# Patient Record
Sex: MALE | Race: BLACK OR AFRICAN AMERICAN | NOT HISPANIC OR LATINO | Employment: UNEMPLOYED | URBAN - METROPOLITAN AREA
[De-identification: names, ages, dates, MRNs, and addresses within clinical notes are randomized per-mention and may not be internally consistent; named-entity substitution may affect disease eponyms.]

---

## 2020-11-11 PROBLEM — H46.9 RIGHT OPTIC NEURITIS: Status: ACTIVE | Noted: 2020-11-11

## 2020-12-16 PROBLEM — H53.59 DYSCHROMATOPSIA: Status: ACTIVE | Noted: 2020-12-16

## 2020-12-16 PROBLEM — N52.8 OTHER MALE ERECTILE DYSFUNCTION: Status: ACTIVE | Noted: 2020-12-16

## 2020-12-16 PROBLEM — G35 MS (MULTIPLE SCLEROSIS): Status: ACTIVE | Noted: 2020-12-16

## 2020-12-16 PROBLEM — R53.83 OTHER FATIGUE: Status: ACTIVE | Noted: 2020-12-16

## 2020-12-16 PROBLEM — G35 OPTIC NEURITIS DUE TO MULTIPLE SCLEROSIS: Status: ACTIVE | Noted: 2020-11-11

## 2020-12-16 PROBLEM — G44.89 OTHER HEADACHE SYNDROME: Status: ACTIVE | Noted: 2020-12-16

## 2021-01-28 PROBLEM — R42 DIZZINESS: Status: ACTIVE | Noted: 2021-01-28

## 2021-01-30 PROBLEM — G44.89 OTHER HEADACHE SYNDROME: Status: RESOLVED | Noted: 2020-12-16 | Resolved: 2021-01-30

## 2021-01-30 PROBLEM — R53.83 OTHER FATIGUE: Status: RESOLVED | Noted: 2020-12-16 | Resolved: 2021-01-30

## 2021-01-31 PROBLEM — F41.9 ANXIETY: Status: ACTIVE | Noted: 2021-01-31

## 2021-12-21 ENCOUNTER — PATIENT MESSAGE (OUTPATIENT)
Dept: NEUROLOGY | Facility: CLINIC | Age: 36
End: 2021-12-21

## 2022-02-28 ENCOUNTER — PATIENT MESSAGE (OUTPATIENT)
Dept: PSYCHIATRY | Facility: CLINIC | Age: 37
End: 2022-02-28

## 2022-03-22 PROBLEM — E66.9 OBESITY: Status: ACTIVE | Noted: 2022-03-22

## 2022-03-22 PROBLEM — L73.2 HYDRADENITIS: Status: ACTIVE | Noted: 2022-03-22

## 2022-03-29 PROBLEM — R79.89 ABNORMAL THYROID BLOOD TEST: Status: ACTIVE | Noted: 2022-03-29

## 2022-06-24 ENCOUNTER — PATIENT MESSAGE (OUTPATIENT)
Dept: PSYCHIATRY | Facility: CLINIC | Age: 37
End: 2022-06-24

## 2022-11-22 PROBLEM — Z00.00 PREVENTATIVE HEALTH CARE: Status: ACTIVE | Noted: 2022-11-22

## 2022-11-22 PROBLEM — Z79.899 ENCOUNTER FOR LONG-TERM CURRENT USE OF MEDICATION: Status: ACTIVE | Noted: 2022-11-22

## 2022-12-01 ENCOUNTER — PATIENT MESSAGE (OUTPATIENT)
Dept: PSYCHIATRY | Facility: CLINIC | Age: 37
End: 2022-12-01

## 2023-02-10 PROBLEM — R26.89 NEED FOR ASSISTANCE DUE TO UNSTEADY GAIT: Status: ACTIVE | Noted: 2023-02-10

## 2023-02-20 ENCOUNTER — PATIENT MESSAGE (OUTPATIENT)
Dept: PSYCHIATRY | Facility: CLINIC | Age: 38
End: 2023-02-20

## 2023-02-27 PROBLEM — Z00.00 PREVENTATIVE HEALTH CARE: Status: RESOLVED | Noted: 2022-11-22 | Resolved: 2023-02-27

## 2023-05-24 PROBLEM — K21.9 GASTROESOPHAGEAL REFLUX DISEASE WITHOUT ESOPHAGITIS: Chronic | Status: ACTIVE | Noted: 2023-05-24

## 2023-05-24 PROBLEM — G47.00 INSOMNIA: Chronic | Status: ACTIVE | Noted: 2023-05-24

## 2023-07-21 ENCOUNTER — PATIENT MESSAGE (OUTPATIENT)
Dept: PSYCHIATRY | Facility: CLINIC | Age: 38
End: 2023-07-21

## 2023-07-31 ENCOUNTER — PATIENT MESSAGE (OUTPATIENT)
Dept: RESEARCH | Facility: HOSPITAL | Age: 38
End: 2023-07-31

## 2023-08-29 PROBLEM — R53.82 CHRONIC FATIGUE: Status: ACTIVE | Noted: 2020-12-16

## 2024-01-22 ENCOUNTER — PATIENT MESSAGE (OUTPATIENT)
Dept: PSYCHIATRY | Facility: CLINIC | Age: 39
End: 2024-01-22

## 2024-02-07 PROBLEM — F32.A DEPRESSION: Chronic | Status: ACTIVE | Noted: 2024-02-07

## 2024-02-07 PROBLEM — F43.10 PTSD (POST-TRAUMATIC STRESS DISORDER): Chronic | Status: ACTIVE | Noted: 2024-02-07

## 2024-02-15 PROBLEM — E66.09 CLASS 1 OBESITY DUE TO EXCESS CALORIES WITH SERIOUS COMORBIDITY AND BODY MASS INDEX (BMI) OF 32.0 TO 32.9 IN ADULT: Chronic | Status: ACTIVE | Noted: 2022-03-22

## 2024-02-15 PROBLEM — Z79.899 ENCOUNTER FOR LONG-TERM CURRENT USE OF MEDICATION: Status: RESOLVED | Noted: 2022-11-22 | Resolved: 2024-02-15

## 2024-02-15 PROBLEM — E66.811 CLASS 1 OBESITY DUE TO EXCESS CALORIES WITH SERIOUS COMORBIDITY AND BODY MASS INDEX (BMI) OF 32.0 TO 32.9 IN ADULT: Chronic | Status: ACTIVE | Noted: 2022-03-22

## 2024-02-15 PROBLEM — R79.89 ABNORMAL THYROID BLOOD TEST: Status: RESOLVED | Noted: 2022-03-29 | Resolved: 2024-02-15

## 2024-03-20 PROBLEM — L73.2 HYDRADENITIS: Chronic | Status: ACTIVE | Noted: 2022-03-22

## 2024-03-20 PROBLEM — F41.9 ANXIETY: Chronic | Status: ACTIVE | Noted: 2021-01-31

## 2024-03-26 ENCOUNTER — PATIENT MESSAGE (OUTPATIENT)
Dept: PSYCHIATRY | Facility: CLINIC | Age: 39
End: 2024-03-26

## 2024-03-28 ENCOUNTER — SOCIAL WORK (OUTPATIENT)
Dept: ADMINISTRATIVE | Facility: OTHER | Age: 39
End: 2024-03-28

## 2024-03-28 NOTE — PROGRESS NOTES
Blanca received a phone call from Dana at [employer] ([phone]). She states the expedited appeal was changed to a standard appeal. Patient did not meet criteria for an expedited appeal. A decision is typically made within 15-20 days. Once decided, a letter and fax will be sent out with the decision and reason for the decision. Blanca verbalized understanding.    Jenifer High LCSW    [phone]

## 2024-04-05 PROBLEM — L73.2 AXILLARY HIDRADENITIS SUPPURATIVA: Status: ACTIVE | Noted: 2017-02-06

## 2024-04-08 ENCOUNTER — SOCIAL WORK (OUTPATIENT)
Dept: ADMINISTRATIVE | Facility: OTHER | Age: 39
End: 2024-04-08

## 2024-04-08 PROBLEM — F12.90 CANNABIS USE DISORDER: Status: ACTIVE | Noted: 2024-04-08

## 2024-04-08 PROBLEM — F33.9 RECURRENT MAJOR DEPRESSIVE DISORDER: Status: ACTIVE | Noted: 2024-04-08

## 2024-04-08 PROBLEM — F32.A DEPRESSION: Chronic | Status: RESOLVED | Noted: 2024-02-07 | Resolved: 2024-04-08

## 2024-04-08 NOTE — PROGRESS NOTES
Sw received a consult to assist with counseling. Sw called and spoke to Patient ([phone]). He is agreeable to counseling. Blanca faxed a referral to [facility] to review.    Jenifer High LCSW    [phone]

## 2024-04-10 ENCOUNTER — SOCIAL WORK (OUTPATIENT)
Dept: ADMINISTRATIVE | Facility: OTHER | Age: 39
End: 2024-04-10

## 2024-04-10 NOTE — PROGRESS NOTES
Sw called [facility] to follow-up on the counseling appointment. The referral was received. Staff will contact Patient to schedule him an assessment. Sw verbalized appreciation.    [facility]  [address] [place], [place]  [phone]    [facility]    [phone]

## 2024-05-02 ENCOUNTER — PATIENT MESSAGE (OUTPATIENT)
Dept: PSYCHIATRY | Facility: CLINIC | Age: 39
End: 2024-05-02

## 2024-05-24 PROBLEM — K02.9 CARIES: Status: ACTIVE | Noted: 2024-05-24

## 2024-07-16 PROBLEM — R42 DIZZINESS: Status: RESOLVED | Noted: 2021-01-28 | Resolved: 2024-07-16

## 2024-07-25 ENCOUNTER — PATIENT MESSAGE (OUTPATIENT)
Dept: PSYCHIATRY | Facility: CLINIC | Age: 39
End: 2024-07-25

## 2024-08-05 ENCOUNTER — PATIENT MESSAGE (OUTPATIENT)
Dept: PSYCHIATRY | Facility: CLINIC | Age: 39
End: 2024-08-05

## 2024-08-08 PROBLEM — H53.8 BLURRY VISION: Status: ACTIVE | Noted: 2024-08-08

## 2024-08-08 PROBLEM — H53.2 MONOCULAR DIPLOPIA: Status: ACTIVE | Noted: 2024-08-08

## 2024-08-08 PROBLEM — M54.50 CHRONIC LOW BACK PAIN: Status: ACTIVE | Noted: 2024-08-08

## 2024-08-08 PROBLEM — G89.29 CHRONIC LOW BACK PAIN: Status: ACTIVE | Noted: 2024-08-08

## 2024-10-30 ENCOUNTER — PATIENT MESSAGE (OUTPATIENT)
Dept: RESEARCH | Facility: HOSPITAL | Age: 39
End: 2024-10-30

## 2024-11-05 ENCOUNTER — PATIENT MESSAGE (OUTPATIENT)
Dept: RESEARCH | Facility: HOSPITAL | Age: 39
End: 2024-11-05

## 2024-12-16 ENCOUNTER — PATIENT MESSAGE (OUTPATIENT)
Dept: PSYCHIATRY | Facility: CLINIC | Age: 39
End: 2024-12-16

## 2024-12-30 PROBLEM — G89.29 CHRONIC LOW BACK PAIN: Chronic | Status: ACTIVE | Noted: 2024-08-08

## 2024-12-30 PROBLEM — G35 MULTIPLE SCLEROSIS EXACERBATION: Status: RESOLVED | Noted: 2020-12-16 | Resolved: 2024-12-30

## 2024-12-30 PROBLEM — M54.50 CHRONIC LOW BACK PAIN: Chronic | Status: ACTIVE | Noted: 2024-08-08

## 2024-12-30 PROBLEM — F12.90 CANNABIS USE DISORDER: Chronic | Status: ACTIVE | Noted: 2024-04-08

## 2024-12-30 PROBLEM — R26.89 NEED FOR ASSISTANCE DUE TO UNSTEADY GAIT: Chronic | Status: ACTIVE | Noted: 2023-02-10
